# Patient Record
Sex: FEMALE | Race: WHITE | NOT HISPANIC OR LATINO
[De-identification: names, ages, dates, MRNs, and addresses within clinical notes are randomized per-mention and may not be internally consistent; named-entity substitution may affect disease eponyms.]

---

## 2019-11-27 ENCOUNTER — TRANSCRIPTION ENCOUNTER (OUTPATIENT)
Age: 29
End: 2019-11-27

## 2019-11-27 ENCOUNTER — APPOINTMENT (OUTPATIENT)
Dept: ORTHOPEDIC SURGERY | Facility: CLINIC | Age: 29
End: 2019-11-27
Payer: COMMERCIAL

## 2019-11-27 VITALS — HEIGHT: 66 IN | BODY MASS INDEX: 20.41 KG/M2 | WEIGHT: 127 LBS | RESPIRATION RATE: 16 BRPM

## 2019-11-27 DIAGNOSIS — Z78.9 OTHER SPECIFIED HEALTH STATUS: ICD-10-CM

## 2019-11-27 PROBLEM — Z00.00 ENCOUNTER FOR PREVENTIVE HEALTH EXAMINATION: Status: ACTIVE | Noted: 2019-11-27

## 2019-11-27 PROCEDURE — 73130 X-RAY EXAM OF HAND: CPT | Mod: LT

## 2019-11-27 PROCEDURE — 76882 US LMTD JT/FCL EVL NVASC XTR: CPT | Mod: LT

## 2019-11-27 PROCEDURE — 99203 OFFICE O/P NEW LOW 30 MIN: CPT | Mod: 25

## 2019-11-27 PROCEDURE — 20612 ASPIRATE/INJ GANGLION CYST: CPT | Mod: LT

## 2019-11-27 NOTE — PHYSICAL EXAM
[de-identified] : Physical exam demonstrates the patient to be alert and oriented x 3 and capable of ambulation. The patient is well-developed and well-nourished in no apparent respiratory distress. The majority of the skin is intact bilaterally in the upper extremities without any bilateral elbow lymphadenopathy. \par \par There is good capillary refill of the digits bilaterally. There are no masses palpated or sensitivity over the median and ulnar nerves at the level of the wrist. There is a negative Tinel's and negative Phalen's and a negative carpal tunnel compression test bilaterally. Sensation is intact to light touch bilaterally.\par \par The wrists have symmetric range of motion bilaterally. There is no tenderness over the scaphoid, scapholunate, or lunotriquetral ligaments bilaterally. There is a negative Henry's test bilaterally. There is no tenderness over the distal radial ulnar joint or TFCC and no evidence of instability bilaterally. There is no tenderness over the pisotriquetral joint, hamate hook, or CMC joints bilaterally. There is 5 5 strength of the wrists bilaterally. The patient is nontender over both scaphoids and anatomic snuffbox is bilaterally. There is no clubbing cyanosis or edema.\par \par \par Evaluation of left ring finger reveals a cystic mass at the level of the proximal digital crease [de-identified] : PA lateral oblique x-rays did not show any significant osseous abnormality. An ultrasound demonstrates a hypoechoic mass

## 2019-11-27 NOTE — REVIEW OF SYSTEMS
[Right] : right [Arthralgia] : arthralgia [Joint Pain] : joint pain [Negative] : Allergic/Immunologic

## 2019-11-27 NOTE — ASSESSMENT
[FreeTextEntry1] : My impression is that the patient has a tenosynovial cyst. We discussed options and she elected to undergo an aspiration. Under informed consent and sterile conditions the mass was aspirated and it was no longer palpable post aspiration

## 2019-11-27 NOTE — HISTORY OF PRESENT ILLNESS
[Right] : right hand dominant [FreeTextEntry1] : 29F presents with a Left ring finger retinacular cyst x 4 months. Pt reports that she has mild pain with pressure. The cyst fluctuates in size. She has not received any treatment prior to this visit.

## 2020-01-08 ENCOUNTER — APPOINTMENT (OUTPATIENT)
Dept: ORTHOPEDIC SURGERY | Facility: CLINIC | Age: 30
End: 2020-01-08
Payer: COMMERCIAL

## 2020-01-08 VITALS — BODY MASS INDEX: 20.41 KG/M2 | WEIGHT: 127 LBS | RESPIRATION RATE: 16 BRPM | HEIGHT: 66 IN

## 2020-01-08 DIAGNOSIS — M67.40 GANGLION, UNSPECIFIED SITE: ICD-10-CM

## 2020-01-08 PROCEDURE — 99214 OFFICE O/P EST MOD 30 MIN: CPT | Mod: 25

## 2020-01-08 PROCEDURE — 20612 ASPIRATE/INJ GANGLION CYST: CPT | Mod: LT

## 2020-03-24 ENCOUNTER — APPOINTMENT (OUTPATIENT)
Dept: ORTHOPEDIC SURGERY | Facility: CLINIC | Age: 30
End: 2020-03-24